# Patient Record
Sex: FEMALE | Race: WHITE
[De-identification: names, ages, dates, MRNs, and addresses within clinical notes are randomized per-mention and may not be internally consistent; named-entity substitution may affect disease eponyms.]

---

## 2020-01-31 ENCOUNTER — HOSPITAL ENCOUNTER (EMERGENCY)
Dept: HOSPITAL 5 - ED | Age: 50
Discharge: HOME | End: 2020-01-31
Payer: COMMERCIAL

## 2020-01-31 VITALS — SYSTOLIC BLOOD PRESSURE: 124 MMHG | DIASTOLIC BLOOD PRESSURE: 78 MMHG
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
## 2020-01-31 DIAGNOSIS — R51: ICD-10-CM

## 2020-01-31 DIAGNOSIS — S46.812A: Primary | ICD-10-CM
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
## 2020-01-31 DIAGNOSIS — R07.89: ICD-10-CM
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
## 2020-01-31 DIAGNOSIS — M54.2: ICD-10-CM
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
